# Patient Record
Sex: FEMALE | HISPANIC OR LATINO | URBAN - METROPOLITAN AREA
[De-identification: names, ages, dates, MRNs, and addresses within clinical notes are randomized per-mention and may not be internally consistent; named-entity substitution may affect disease eponyms.]

---

## 2019-01-07 ENCOUNTER — APPOINTMENT (RX ONLY)
Dept: URBAN - METROPOLITAN AREA CLINIC 71 | Facility: CLINIC | Age: 64
Setting detail: DERMATOLOGY
End: 2019-01-07

## 2019-01-07 DIAGNOSIS — L30.9 DERMATITIS, UNSPECIFIED: ICD-10-CM

## 2019-01-07 PROCEDURE — ? ORDER TESTS

## 2019-01-07 PROCEDURE — ? BIOPSY BY PUNCH METHOD

## 2019-01-07 PROCEDURE — ? PRESCRIPTION

## 2019-01-07 PROCEDURE — 11104 PUNCH BX SKIN SINGLE LESION: CPT

## 2019-01-07 PROCEDURE — ? ADDITIONAL NOTES

## 2019-01-07 PROCEDURE — 11105 PUNCH BX SKIN EA SEP/ADDL: CPT

## 2019-01-07 RX ORDER — PREDNISONE 50 MG/1
TABLET ORAL
Qty: 5 | Refills: 0 | Status: ERX | COMMUNITY
Start: 2019-01-07

## 2019-01-07 RX ADMIN — PREDNISONE: 50 TABLET ORAL at 22:23

## 2019-01-07 ASSESSMENT — LOCATION DETAILED DESCRIPTION DERM
LOCATION DETAILED: RIGHT ANTERIOR DISTAL THIGH
LOCATION DETAILED: LEFT ANTERIOR DISTAL THIGH

## 2019-01-07 ASSESSMENT — LOCATION SIMPLE DESCRIPTION DERM
LOCATION SIMPLE: RIGHT THIGH
LOCATION SIMPLE: LEFT THIGH

## 2019-01-07 ASSESSMENT — LOCATION ZONE DERM: LOCATION ZONE: LEG

## 2019-01-07 NOTE — PROCEDURE: ADDITIONAL NOTES
Additional Notes: Diffuse petechiae on bilateral legs. Pitting edema in right lower leg (had negative us at hospital). Alt and ast elevated. Denies new medications or traveling.
Detail Level: Simple
Additional Notes: Denies new medications, supplements or Tylenol nsaid usage prior to rash

## 2019-01-07 NOTE — HPI: RASH
What Type Of Note Output Would You Prefer (Optional)?: Bullet Format
Is This A New Presentation, Or A Follow-Up?: Rash
Additional History: She was treated for cellulitis with Ceftin at first visit. She started having increased pain and returned to ER. She was then treated for Viral infection with doxycycline. Lyme titer was negative. She has active rash with burning in both ankles. She denies any itching.

## 2019-01-17 ENCOUNTER — RX ONLY (OUTPATIENT)
Age: 64
Setting detail: RX ONLY
End: 2019-01-17

## 2019-01-17 RX ORDER — PREDNISONE 50 MG/1
TABLET ORAL
Qty: 5 | Refills: 0 | Status: ERX

## 2019-01-21 ENCOUNTER — APPOINTMENT (RX ONLY)
Dept: URBAN - METROPOLITAN AREA CLINIC 71 | Facility: CLINIC | Age: 64
Setting detail: DERMATOLOGY
End: 2019-01-21

## 2019-01-21 DIAGNOSIS — Z48.02 ENCOUNTER FOR REMOVAL OF SUTURES: ICD-10-CM

## 2019-01-21 PROCEDURE — ? ADDITIONAL NOTES

## 2019-01-21 PROCEDURE — ? SUTURE REMOVAL (GLOBAL PERIOD)

## 2019-01-21 PROCEDURE — 99024 POSTOP FOLLOW-UP VISIT: CPT

## 2019-01-21 ASSESSMENT — LOCATION SIMPLE DESCRIPTION DERM
LOCATION SIMPLE: LEFT THIGH
LOCATION SIMPLE: RIGHT THIGH

## 2019-01-21 ASSESSMENT — LOCATION DETAILED DESCRIPTION DERM
LOCATION DETAILED: RIGHT ANTERIOR PROXIMAL THIGH
LOCATION DETAILED: LEFT ANTERIOR DISTAL THIGH

## 2019-01-21 ASSESSMENT — LOCATION ZONE DERM: LOCATION ZONE: LEG

## 2019-01-21 NOTE — PROCEDURE: SUTURE REMOVAL (GLOBAL PERIOD)
Detail Level: Detailed
Add 22479 Cpt? (Important Note: In 2017 The Use Of 84435 Is Being Tracked By Cms To Determine Future Global Period Reimbursement For Global Periods): yes

## 2019-01-21 NOTE — PROCEDURE: ADDITIONAL NOTES
Additional Notes: Had previously discussed labs on 1/18 and need for further work up. Additional prednisone given. \\n\\nPatient improved with prednisone, however now having swelling in left lower leg. Recommend work up with pcp/internal medicine given symptoms and labs. Discussed in detail and copies given of path and labs. Patient is self pay and does not have insurance, she will follow up at clinic in [place] where she is moving in two weeks. Additional Notes: Had previously discussed labs on 1/18 and need for further work up. Additional prednisone given. \\n\\nPatient improved with prednisone, however now having swelling in left lower leg. Recommend work up with pcp/internal medicine given symptoms and labs. Discussed in detail and copies given of path and labs. Patient is self pay and does not have insurance, she will follow up at clinic in [place] where she is moving in two weeks.